# Patient Record
Sex: FEMALE | Race: WHITE
[De-identification: names, ages, dates, MRNs, and addresses within clinical notes are randomized per-mention and may not be internally consistent; named-entity substitution may affect disease eponyms.]

---

## 2019-07-05 ENCOUNTER — HOSPITAL ENCOUNTER (EMERGENCY)
Dept: HOSPITAL 74 - FER | Age: 34
Discharge: HOME | End: 2019-07-05
Payer: COMMERCIAL

## 2019-07-05 VITALS — BODY MASS INDEX: 19.1 KG/M2

## 2019-07-05 VITALS — DIASTOLIC BLOOD PRESSURE: 74 MMHG | HEART RATE: 85 BPM | TEMPERATURE: 98 F | SYSTOLIC BLOOD PRESSURE: 121 MMHG

## 2019-07-05 DIAGNOSIS — K62.5: ICD-10-CM

## 2019-07-05 DIAGNOSIS — R19.00: Primary | ICD-10-CM

## 2019-07-05 DIAGNOSIS — R19.7: ICD-10-CM

## 2019-07-05 LAB
ALBUMIN SERPL-MCNC: 3.5 G/DL (ref 3.4–5)
ALP SERPL-CCNC: 38 U/L (ref 45–117)
ALT SERPL-CCNC: 16 U/L (ref 13–61)
ANION GAP SERPL CALC-SCNC: 7 MMOL/L (ref 8–16)
APTT BLD: 29.6 SECONDS (ref 25.2–36.5)
AST SERPL-CCNC: 18 U/L (ref 15–37)
BASOPHILS # BLD: 0.4 % (ref 0–2)
BILIRUB SERPL-MCNC: 1.5 MG/DL (ref 0.2–1)
BUN SERPL-MCNC: 11 MG/DL (ref 7–18)
CALCIUM SERPL-MCNC: 8.7 MG/DL (ref 8.5–10)
CHLORIDE SERPL-SCNC: 102 MMOL/L (ref 98–107)
CO2 SERPL-SCNC: 25 MMOL/L (ref 21–32)
CREAT SERPL-MCNC: 0.7 MG/DL (ref 0.55–1.3)
DEPRECATED RDW RBC AUTO: 14.1 % (ref 11.6–15.6)
EOSINOPHIL # BLD: 0.6 % (ref 0–4.5)
EPITH CASTS URNS QL MICRO: (no result) /HPF
GLUCOSE SERPL-MCNC: 135 MG/DL (ref 74–106)
HCT VFR BLD CALC: 47.6 % (ref 32.4–45.2)
HGB BLD-MCNC: 15.2 GM/DL (ref 10.7–15.3)
INR BLD: 1.16 (ref 0.82–1.09)
LYMPHOCYTES # BLD: 10.5 % (ref 8–40)
MCH RBC QN AUTO: 23.5 PG (ref 25.7–33.7)
MCHC RBC AUTO-ENTMCNC: 31.8 G/DL (ref 32–36)
MCV RBC: 73.7 FL (ref 80–96)
MONOCYTES # BLD AUTO: 5.4 % (ref 3.8–10.2)
MUCOUS THREADS URNS QL MICRO: (no result)
NEUTROPHILS # BLD: 83.1 % (ref 42.8–82.8)
PLATELET # BLD AUTO: 195 K/MM3 (ref 134–434)
PMV BLD: 9.6 FL (ref 7.5–11.1)
POTASSIUM SERPLBLD-SCNC: 3.8 MMOL/L (ref 3.5–5.1)
PROT SERPL-MCNC: 6.8 G/DL (ref 6.4–8.2)
PT PNL PPP: 12.9 SEC (ref 10.2–13)
RBC # BLD AUTO: 6.46 M/MM3 (ref 3.6–5.2)
SODIUM SERPL-SCNC: 134 MMOL/L (ref 136–145)
WBC # BLD AUTO: 9.1 K/MM3 (ref 4–10.8)

## 2019-07-05 PROCEDURE — 3E0337Z INTRODUCTION OF ELECTROLYTIC AND WATER BALANCE SUBSTANCE INTO PERIPHERAL VEIN, PERCUTANEOUS APPROACH: ICD-10-PCS

## 2019-07-05 NOTE — PDOC
History of Present Illness





- General


Chief Complaint: Rectal Bleed


Stated Complaint: DIARRHEA, RECTAL BLEED


Time Seen by Provider: 07/05/19 10:16





- History of Present Illness


Initial Comments: 





07/05/19 11:00


33yo female with hx of uterine fibroids with diarrhea and rectal bleeding. Pt 

states diarrhea started yesterday. States she ate a corn/tomato salad and shell 

chicken, which is the same thing her mother ate. States diarrhea started in the 

afternoon and then became watery stool. No recent travel (drove up from 

[place] to visit her family). No recent abx. No recent sick contacts. Pt 

states she felt cramping yesterday with the diarrhea, but none today. Pt states 

rectal bleeding started today. BRBPR. States both blood on the tissue and in 

the stool. States bright red, no clots, no dark red, denies black/tarry stools. 

No lightheaded or dizzy sensation. No dysuria. No cp/sob. No abd pain. No other 

complaints. 





PMhx: uterine fibroids


Pshx: myomectomy 3 years ago


All: nkda





Past History





- Past Medical History


Allergies/Adverse Reactions: 


 Allergies











Allergy/AdvReac Type Severity Reaction Status Date / Time


 


No Known Allergies Allergy   Verified 07/05/19 10:09











Home Medications: 


Ambulatory Orders





Iron,Carbonyl/Ascorbic Acid [Vitron-C Tablet] 1 each PO DAILY 07/05/19 


Loperamide HCl [Imodium -] 2 mg PO ASDIR PRN 07/05/19 








Anemia: Yes


COPD: No


Other medical history: UTERINE FIBROIDS





- Suicide/Smoking/Psychosocial Hx


Smoking History: Never smoked


Have you smoked in the past 12 months: No


Information on smoking cessation initiated: No


Hx Alcohol Use: No





**Review of Systems





- Review of Systems


Able to Perform ROS?: Yes


Is the patient limited English proficient: No


Constitutional: No: Chills, Fever


HEENTM: No: Nose Congestion, Throat Pain


Respiratory: No: Cough, Shortness of Breath


Cardiac (ROS): No: Chest Pain, Lightheadedness, Palpitations


ABD/GI: Yes: Blood Streaked Bowels, Diarrhea, Rectal Bleeding, Abdominal 

cramping.  No: Nausea, Vomiting


: Yes: Other (no vaginal bleeding).  No: Burning, Dysuria, Discharge


Musculoskeletal: No: Back Pain


Integumentary: No: Bruising, Rash


Neurological: No: Headache, Numbness, Paresthesia, Ataxia


All Other Systems: Reviewed and Negative





*Physical Exam





- Vital Signs


 Last Vital Signs











Temp Pulse Resp BP Pulse Ox


 


 98.7 F   110 H  18   127/84   98 


 


 07/05/19 10:08  07/05/19 10:08  07/05/19 10:08  07/05/19 10:08  07/05/19 10:08














- Physical Exam


General Appearance: Yes: Nourished, Appropriately Dressed.  No: Apparent 

Distress


HEENT: positive: EOMI, ELLE, Pharynx Normal


Neck: positive: Supple


Respiratory/Chest: positive: Lungs Clear, Normal Breath Sounds.  negative: 

Respiratory Distress


Cardiovascular: positive: Regular Rhythm, Regular Rate, S1, S2.  negative: Edema


Gastrointestinal/Abdominal: positive: Normal Bowel Sounds, Soft, Other (

palpable enlarged uterus).  negative: Guarding, Rebound, Tenderness


Rectal Exam: positive: normal rectal tone, heme positive stool, other (no masses

, no fissures, trace amount of bright red on the glove, vault empty).  negative

: hemorrhoids


Musculoskeletal: positive: Normal Inspection.  negative: CVA Tenderness


Extremity: positive: Normal Capillary Refill, Normal Inspection, Normal Range 

of Motion


Integumentary: positive: Normal Color, Dry, Warm


Neurologic: positive: Fully Oriented, Alert, Normal Mood/Affect, Motor Strength 

5/5, Other (ambulatory in the ED)





ED Treatment Course





- LABORATORY


CBC & Chemistry Diagram: 


 07/05/19 10:45





 07/05/19 10:45





Medical Decision Making





- Medical Decision Making





07/05/19 11:05


a/p: 33yo female with BRBPR and diarrhea since yesterday


-no abd ttp


-trace amount of blood on the glove, will check labs


-will give ivf hydration


-pt denies lightheaded or dizziness


-not tachy on exam


-states abd is at baseline with palpable uterus, states it has been that way 

since her surgery 3 years ago. no vomiting, no nausea, last menstrual cycle was 

end of june. 


-will send labs, coags, urine, upreg


-will give ivf hydration


-if elevated wbc or low h/h will obtain ct


-discussed with patient who agrees with the plan


-denies wt loss or night sweats.


07/05/19 11:24


upreg negative


h/h stable


dehydrated on labs





07/05/19 12:02


elevated bili in urine and tbili elevated


blood in urine


blood in stool


will obtain ct abd/pelvis


pt updated


07/05/19 12:56


case discussed with radiology - ascites concerning for malignant ascites, also 

with large mass in the abd concerning for malignant peritoneal carcinomatosis. 

recommends GYN ONC eval


pt states recent endometrial bx performed a few moths ago with her GYN in 

[facility] which was normal


pt updated on labs and imaging results


call placed to [facility] transfer center.


07/05/19 12:57


radiology also recommends ct chest


discussed with the patient who agrees with the plan


07/05/19 13:39


no episodes of diarrhea or brbpr in the ED


case was discussed with Dr. Aleman from GYN-Onc at [facility]. per Dr. Aleman, best 

for outpt appt on monday. states to see Dr. Soares on monday at 3pm in the 

office


this was discussed with the patient who agrees with the plan. Discussed in full 

detail all reasons to return to the ED and need for followup. answered all 

questions. Discussed needing management of the mass in the abd. discussed 

follow up on monday


mother and the patient verbalize understanding.








*DC/Admit/Observation/Transfer


Diagnosis at time of Disposition: 


 Abdominal mass, Rectal bleed, Diarrhea








- Discharge Dispostion


Disposition: HOME


Condition at time of disposition: Stable


Decision to Admit order: No





- Referrals


Referrals: 


Dr. Nena [Other] (Appointment time is 3pm on Monday July 8, 2019)





- Patient Instructions


Printed Discharge Instructions:  DI for Rectal Bleeding


Additional Instructions: 


An appointment was made for you with Dr. Soares on Monday at 3pm. Dr. Oanh christianson is a gyn/onc. This appointment is to further evaluate your abdominal mass. 

Please do not miss this appointment. If you have any further questions or 

concerns please return to the ED. If you develop further rectal bleeding please 

return to the ED. If you develop fevers, chills, nausea, vomiting or any 

further concerns please return to the ED. 


Dr. Soares is located at [address] [place] [address], [address] [place] [address]. This appointment is for Monday July 8th at 3pm. 


Please take lab results and imaging cd to this appointment. 





- Post Discharge Activity